# Patient Record
Sex: MALE | Race: WHITE | NOT HISPANIC OR LATINO | Employment: FULL TIME | URBAN - METROPOLITAN AREA
[De-identification: names, ages, dates, MRNs, and addresses within clinical notes are randomized per-mention and may not be internally consistent; named-entity substitution may affect disease eponyms.]

---

## 2023-06-14 DIAGNOSIS — M25.511 RIGHT SHOULDER PAIN, UNSPECIFIED CHRONICITY: Primary | ICD-10-CM

## 2023-06-15 ENCOUNTER — HOSPITAL ENCOUNTER (OUTPATIENT)
Dept: GENERAL RADIOLOGY | Facility: HOSPITAL | Age: 68
Discharge: HOME | End: 2023-06-15
Payer: OTHER MISCELLANEOUS

## 2023-06-15 DIAGNOSIS — M25.511 RIGHT SHOULDER PAIN, UNSPECIFIED CHRONICITY: ICD-10-CM

## 2023-06-15 PROCEDURE — 73030 X-RAY EXAM OF SHOULDER: CPT

## 2023-06-27 ENCOUNTER — TELEPHONE (OUTPATIENT)
Dept: ORTHOPEDIC SURGERY | Facility: CLINIC | Age: 68
End: 2023-06-27

## 2023-07-02 PROBLEM — G89.29 CHRONIC RIGHT SHOULDER PAIN: Status: ACTIVE | Noted: 2023-07-02

## 2023-07-02 PROBLEM — M25.511 CHRONIC RIGHT SHOULDER PAIN: Status: ACTIVE | Noted: 2023-07-02

## 2023-07-24 ENCOUNTER — ANESTHESIA (OUTPATIENT)
Dept: PERIOP | Facility: HOSPITAL | Age: 68
End: 2023-07-24
Payer: OTHER MISCELLANEOUS

## 2023-07-24 ENCOUNTER — ANESTHESIA EVENT (OUTPATIENT)
Dept: PERIOP | Facility: HOSPITAL | Age: 68
End: 2023-07-24
Payer: OTHER MISCELLANEOUS

## 2023-07-24 ENCOUNTER — HOSPITAL ENCOUNTER (OUTPATIENT)
Facility: HOSPITAL | Age: 68
Setting detail: HOSPITAL OUTPATIENT SURGERY
Discharge: HOME | End: 2023-07-24
Attending: ORTHOPAEDIC SURGERY | Admitting: ANESTHESIOLOGY
Payer: OTHER MISCELLANEOUS

## 2023-07-24 VITALS
DIASTOLIC BLOOD PRESSURE: 82 MMHG | OXYGEN SATURATION: 95 % | RESPIRATION RATE: 16 BRPM | BODY MASS INDEX: 35.4 KG/M2 | TEMPERATURE: 97.6 F | SYSTOLIC BLOOD PRESSURE: 131 MMHG | HEART RATE: 70 BPM | WEIGHT: 220.24 LBS | HEIGHT: 66 IN

## 2023-07-24 DIAGNOSIS — G89.29 CHRONIC RIGHT SHOULDER PAIN: ICD-10-CM

## 2023-07-24 DIAGNOSIS — M25.511 CHRONIC RIGHT SHOULDER PAIN: Primary | ICD-10-CM

## 2023-07-24 DIAGNOSIS — G89.29 CHRONIC RIGHT SHOULDER PAIN: Primary | ICD-10-CM

## 2023-07-24 DIAGNOSIS — M25.511 CHRONIC RIGHT SHOULDER PAIN: ICD-10-CM

## 2023-07-24 PROCEDURE — 25010000002 ROPIVACAINE PER 1 MG: Performed by: ANESTHESIOLOGY

## 2023-07-24 PROCEDURE — 25010000002 CEFAZOLIN IN DEXTROSE 2-4 GM/100ML-% SOLUTION: Performed by: ORTHOPAEDIC SURGERY

## 2023-07-24 PROCEDURE — 25010000002 DEXAMETHASONE PER 1 MG: Performed by: ANESTHESIOLOGY

## 2023-07-24 PROCEDURE — 25010000002 FENTANYL CITRATE (PF) 50 MCG/ML SOLUTION: Performed by: ANESTHESIOLOGY

## 2023-07-24 PROCEDURE — 25010000002 ROPIVACAINE PER 1 MG: Performed by: ORTHOPAEDIC SURGERY

## 2023-07-24 PROCEDURE — 25010000002 EPINEPHRINE PER 0.1 MG: Performed by: ORTHOPAEDIC SURGERY

## 2023-07-24 PROCEDURE — C1713 ANCHOR/SCREW BN/BN,TIS/BN: HCPCS | Performed by: ORTHOPAEDIC SURGERY

## 2023-07-24 PROCEDURE — 25010000002 MIDAZOLAM PER 1 MG: Performed by: ANESTHESIOLOGY

## 2023-07-24 PROCEDURE — 25010000002 PROPOFOL 10 MG/ML EMULSION: Performed by: ANESTHESIOLOGY

## 2023-07-24 DEVICE — IMPLANTABLE DEVICE
Type: IMPLANTABLE DEVICE | Site: SHOULDER | Status: FUNCTIONAL
Brand: VENTIX™

## 2023-07-24 DEVICE — BLUE CO-BRAID POLYETHYLENE SIZE 2 38" C-7 NEEDLE BIOMET
Type: IMPLANTABLE DEVICE | Site: SHOULDER | Status: FUNCTIONAL
Brand: TELEFLEX

## 2023-07-24 DEVICE — KNOTLESS TISSUE CONTROL DEVICE, UNDYED UNIDIRECTIONAL (ANTIBACTERIAL) SYNTHETIC ABSORBABLE DEVICE
Type: IMPLANTABLE DEVICE | Site: SHOULDER | Status: FUNCTIONAL
Brand: STRATAFIX

## 2023-07-24 RX ORDER — LIDOCAINE HYDROCHLORIDE 10 MG/ML
0.5 INJECTION, SOLUTION EPIDURAL; INFILTRATION; INTRACAUDAL; PERINEURAL ONCE AS NEEDED
Status: COMPLETED | OUTPATIENT
Start: 2023-07-24 | End: 2023-07-24

## 2023-07-24 RX ORDER — LIDOCAINE HYDROCHLORIDE 20 MG/ML
INJECTION, SOLUTION INFILTRATION; PERINEURAL AS NEEDED
Status: DISCONTINUED | OUTPATIENT
Start: 2023-07-24 | End: 2023-07-24 | Stop reason: SURG

## 2023-07-24 RX ORDER — FLUMAZENIL 0.1 MG/ML
0.2 INJECTION INTRAVENOUS AS NEEDED
Status: DISCONTINUED | OUTPATIENT
Start: 2023-07-24 | End: 2023-07-24 | Stop reason: HOSPADM

## 2023-07-24 RX ORDER — DROPERIDOL 2.5 MG/ML
0.62 INJECTION, SOLUTION INTRAMUSCULAR; INTRAVENOUS
Status: DISCONTINUED | OUTPATIENT
Start: 2023-07-24 | End: 2023-07-24 | Stop reason: HOSPADM

## 2023-07-24 RX ORDER — FENTANYL CITRATE 50 UG/ML
25 INJECTION, SOLUTION INTRAMUSCULAR; INTRAVENOUS
Status: DISCONTINUED | OUTPATIENT
Start: 2023-07-24 | End: 2023-07-24 | Stop reason: HOSPADM

## 2023-07-24 RX ORDER — CEFAZOLIN SODIUM 2 G/100ML
2 INJECTION, SOLUTION INTRAVENOUS ONCE
Status: COMPLETED | OUTPATIENT
Start: 2023-07-24 | End: 2023-07-24

## 2023-07-24 RX ORDER — DEXAMETHASONE SODIUM PHOSPHATE 10 MG/ML
INJECTION INTRAMUSCULAR; INTRAVENOUS AS NEEDED
Status: DISCONTINUED | OUTPATIENT
Start: 2023-07-24 | End: 2023-07-24 | Stop reason: SURG

## 2023-07-24 RX ORDER — NALOXONE HCL 0.4 MG/ML
0.2 VIAL (ML) INJECTION AS NEEDED
Status: DISCONTINUED | OUTPATIENT
Start: 2023-07-24 | End: 2023-07-24 | Stop reason: HOSPADM

## 2023-07-24 RX ORDER — FENTANYL CITRATE 50 UG/ML
50 INJECTION, SOLUTION INTRAMUSCULAR; INTRAVENOUS
Status: DISCONTINUED | OUTPATIENT
Start: 2023-07-24 | End: 2023-07-24 | Stop reason: HOSPADM

## 2023-07-24 RX ORDER — LABETALOL HYDROCHLORIDE 5 MG/ML
5 INJECTION, SOLUTION INTRAVENOUS
Status: DISCONTINUED | OUTPATIENT
Start: 2023-07-24 | End: 2023-07-24 | Stop reason: HOSPADM

## 2023-07-24 RX ORDER — MIDAZOLAM HYDROCHLORIDE 1 MG/ML
2 INJECTION INTRAMUSCULAR; INTRAVENOUS
Status: DISCONTINUED | OUTPATIENT
Start: 2023-07-24 | End: 2023-07-24 | Stop reason: HOSPADM

## 2023-07-24 RX ORDER — DIPHENHYDRAMINE HYDROCHLORIDE 50 MG/ML
12.5 INJECTION INTRAMUSCULAR; INTRAVENOUS
Status: DISCONTINUED | OUTPATIENT
Start: 2023-07-24 | End: 2023-07-24 | Stop reason: HOSPADM

## 2023-07-24 RX ORDER — ROPIVACAINE HYDROCHLORIDE 5 MG/ML
INJECTION, SOLUTION EPIDURAL; INFILTRATION; PERINEURAL AS NEEDED
Status: DISCONTINUED | OUTPATIENT
Start: 2023-07-24 | End: 2023-07-24 | Stop reason: HOSPADM

## 2023-07-24 RX ORDER — HYDROCODONE BITARTRATE AND ACETAMINOPHEN 7.5; 325 MG/1; MG/1
1 TABLET ORAL EVERY 4 HOURS PRN
Status: DISCONTINUED | OUTPATIENT
Start: 2023-07-24 | End: 2023-07-24 | Stop reason: HOSPADM

## 2023-07-24 RX ORDER — DEXAMETHASONE SODIUM PHOSPHATE 4 MG/ML
INJECTION, SOLUTION INTRA-ARTICULAR; INTRALESIONAL; INTRAMUSCULAR; INTRAVENOUS; SOFT TISSUE
Status: COMPLETED | OUTPATIENT
Start: 2023-07-24 | End: 2023-07-24

## 2023-07-24 RX ORDER — FENTANYL CITRATE 50 UG/ML
100 INJECTION, SOLUTION INTRAMUSCULAR; INTRAVENOUS
Status: DISCONTINUED | OUTPATIENT
Start: 2023-07-24 | End: 2023-07-24 | Stop reason: HOSPADM

## 2023-07-24 RX ORDER — SODIUM CHLORIDE 0.9 % (FLUSH) 0.9 %
3 SYRINGE (ML) INJECTION EVERY 12 HOURS SCHEDULED
Status: DISCONTINUED | OUTPATIENT
Start: 2023-07-24 | End: 2023-07-24 | Stop reason: HOSPADM

## 2023-07-24 RX ORDER — IPRATROPIUM BROMIDE AND ALBUTEROL SULFATE 2.5; .5 MG/3ML; MG/3ML
3 SOLUTION RESPIRATORY (INHALATION) ONCE AS NEEDED
Status: DISCONTINUED | OUTPATIENT
Start: 2023-07-24 | End: 2023-07-24 | Stop reason: HOSPADM

## 2023-07-24 RX ORDER — EPHEDRINE SULFATE 50 MG/ML
INJECTION, SOLUTION INTRAVENOUS AS NEEDED
Status: DISCONTINUED | OUTPATIENT
Start: 2023-07-24 | End: 2023-07-24 | Stop reason: SURG

## 2023-07-24 RX ORDER — PROMETHAZINE HYDROCHLORIDE 25 MG/1
25 TABLET ORAL ONCE AS NEEDED
Status: DISCONTINUED | OUTPATIENT
Start: 2023-07-24 | End: 2023-07-24 | Stop reason: HOSPADM

## 2023-07-24 RX ORDER — HYDRALAZINE HYDROCHLORIDE 20 MG/ML
5 INJECTION INTRAMUSCULAR; INTRAVENOUS
Status: DISCONTINUED | OUTPATIENT
Start: 2023-07-24 | End: 2023-07-24 | Stop reason: HOSPADM

## 2023-07-24 RX ORDER — PROMETHAZINE HYDROCHLORIDE 25 MG/1
25 SUPPOSITORY RECTAL ONCE AS NEEDED
Status: DISCONTINUED | OUTPATIENT
Start: 2023-07-24 | End: 2023-07-24 | Stop reason: HOSPADM

## 2023-07-24 RX ORDER — ONDANSETRON 2 MG/ML
4 INJECTION INTRAMUSCULAR; INTRAVENOUS ONCE AS NEEDED
Status: DISCONTINUED | OUTPATIENT
Start: 2023-07-24 | End: 2023-07-24 | Stop reason: HOSPADM

## 2023-07-24 RX ORDER — SODIUM CHLORIDE 0.9 % (FLUSH) 0.9 %
3-10 SYRINGE (ML) INJECTION AS NEEDED
Status: DISCONTINUED | OUTPATIENT
Start: 2023-07-24 | End: 2023-07-24 | Stop reason: HOSPADM

## 2023-07-24 RX ORDER — HYDROCODONE BITARTRATE AND ACETAMINOPHEN 5; 325 MG/1; MG/1
1 TABLET ORAL ONCE AS NEEDED
Status: DISCONTINUED | OUTPATIENT
Start: 2023-07-24 | End: 2023-07-24 | Stop reason: HOSPADM

## 2023-07-24 RX ORDER — ROPIVACAINE HYDROCHLORIDE 5 MG/ML
INJECTION, SOLUTION EPIDURAL; INFILTRATION; PERINEURAL
Status: COMPLETED | OUTPATIENT
Start: 2023-07-24 | End: 2023-07-24

## 2023-07-24 RX ORDER — HYDROCODONE BITARTRATE AND ACETAMINOPHEN 5; 325 MG/1; MG/1
1 TABLET ORAL EVERY 4 HOURS PRN
Qty: 30 TABLET | Refills: 0 | Status: SHIPPED | OUTPATIENT
Start: 2023-07-24 | End: 2023-07-24 | Stop reason: SDUPTHER

## 2023-07-24 RX ORDER — HYDROCODONE BITARTRATE AND ACETAMINOPHEN 5; 325 MG/1; MG/1
1 TABLET ORAL EVERY 4 HOURS PRN
Qty: 30 TABLET | Refills: 0 | Status: SHIPPED | OUTPATIENT
Start: 2023-07-24

## 2023-07-24 RX ORDER — PROPOFOL 10 MG/ML
VIAL (ML) INTRAVENOUS AS NEEDED
Status: DISCONTINUED | OUTPATIENT
Start: 2023-07-24 | End: 2023-07-24 | Stop reason: SURG

## 2023-07-24 RX ORDER — SODIUM CHLORIDE, SODIUM LACTATE, POTASSIUM CHLORIDE, CALCIUM CHLORIDE 600; 310; 30; 20 MG/100ML; MG/100ML; MG/100ML; MG/100ML
9 INJECTION, SOLUTION INTRAVENOUS CONTINUOUS
Status: DISCONTINUED | OUTPATIENT
Start: 2023-07-24 | End: 2023-07-24 | Stop reason: HOSPADM

## 2023-07-24 RX ORDER — EPHEDRINE SULFATE 50 MG/ML
5 INJECTION, SOLUTION INTRAVENOUS ONCE AS NEEDED
Status: DISCONTINUED | OUTPATIENT
Start: 2023-07-24 | End: 2023-07-24 | Stop reason: HOSPADM

## 2023-07-24 RX ORDER — HYDROMORPHONE HYDROCHLORIDE 1 MG/ML
0.25 INJECTION, SOLUTION INTRAMUSCULAR; INTRAVENOUS; SUBCUTANEOUS
Status: DISCONTINUED | OUTPATIENT
Start: 2023-07-24 | End: 2023-07-24 | Stop reason: HOSPADM

## 2023-07-24 RX ADMIN — EPHEDRINE SULFATE 10 MG: 50 INJECTION INTRAVENOUS at 10:10

## 2023-07-24 RX ADMIN — SODIUM CHLORIDE, POTASSIUM CHLORIDE, SODIUM LACTATE AND CALCIUM CHLORIDE: 600; 310; 30; 20 INJECTION, SOLUTION INTRAVENOUS at 11:16

## 2023-07-24 RX ADMIN — FENTANYL CITRATE 50 MCG: 50 INJECTION, SOLUTION INTRAMUSCULAR; INTRAVENOUS at 08:10

## 2023-07-24 RX ADMIN — EPHEDRINE SULFATE 10 MG: 50 INJECTION INTRAVENOUS at 10:16

## 2023-07-24 RX ADMIN — ROPIVACAINE HYDROCHLORIDE 20 ML: 5 INJECTION EPIDURAL; INFILTRATION; PERINEURAL at 08:23

## 2023-07-24 RX ADMIN — EPHEDRINE SULFATE 10 MG: 50 INJECTION INTRAVENOUS at 10:30

## 2023-07-24 RX ADMIN — MIDAZOLAM 2 MG: 1 INJECTION INTRAMUSCULAR; INTRAVENOUS at 08:10

## 2023-07-24 RX ADMIN — CEFAZOLIN SODIUM 2 G: 2 INJECTION, SOLUTION INTRAVENOUS at 09:22

## 2023-07-24 RX ADMIN — DEXAMETHASONE SODIUM PHOSPHATE 4 MG: 4 INJECTION, SOLUTION INTRA-ARTICULAR; INTRALESIONAL; INTRAMUSCULAR; INTRAVENOUS; SOFT TISSUE at 08:23

## 2023-07-24 RX ADMIN — SODIUM CHLORIDE, POTASSIUM CHLORIDE, SODIUM LACTATE AND CALCIUM CHLORIDE 9 ML/HR: 600; 310; 30; 20 INJECTION, SOLUTION INTRAVENOUS at 08:04

## 2023-07-24 RX ADMIN — DEXAMETHASONE SODIUM PHOSPHATE 8 MG: 10 INJECTION INTRAMUSCULAR; INTRAVENOUS at 09:42

## 2023-07-24 RX ADMIN — EPHEDRINE SULFATE 10 MG: 50 INJECTION INTRAVENOUS at 09:48

## 2023-07-24 RX ADMIN — PROPOFOL 200 MG: 10 INJECTION, EMULSION INTRAVENOUS at 09:34

## 2023-07-24 RX ADMIN — LIDOCAINE HYDROCHLORIDE 0.5 ML: 10 INJECTION, SOLUTION EPIDURAL; INFILTRATION; INTRACAUDAL; PERINEURAL at 08:04

## 2023-07-24 RX ADMIN — LIDOCAINE HYDROCHLORIDE 60 MG: 20 INJECTION, SOLUTION INFILTRATION; PERINEURAL at 09:34

## 2023-07-24 NOTE — ANESTHESIA PROCEDURE NOTES
Peripheral Block    Pre-sedation assessment completed: 7/24/2023 8:15 AM    Patient reassessed immediately prior to procedure    Patient location during procedure: pre-op  Start time: 7/24/2023 8:15 AM  Stop time: 7/24/2023 8:23 AM  Reason for block: at surgeon's request and post-op pain management  Performed by  Anesthesiologist: Sid White MD  Preanesthetic Checklist  Completed: patient identified, IV checked, site marked, risks and benefits discussed, surgical consent, monitors and equipment checked, pre-op evaluation and timeout performed  Prep:  Pt Position: supine  Sterile barriers:cap, gloves, mask and sterile barriers  Prep: ChloraPrep  Patient monitoring: blood pressure monitoring, continuous pulse oximetry and EKG  Procedure    Sedation: yes  Performed under: local infiltration  Guidance:ultrasound guided    ULTRASOUND INTERPRETATION.  Using ultrasound guidance a 22 G gauge needle was placed in close proximity to the brachial plexus nerve, at which point, under ultrasound guidance anesthetic was injected in the area of the nerve and spread of the anesthesia was seen on ultrasound in close proximity thereto.  There were no abnormalities seen on ultrasound; a digital image was taken; and the patient tolerated the procedure with no complications. Images:still images obtained, printed/placed on chart (U/S used to localize the nerve)    Laterality:right  Block Type:interscalene  Injection Technique:single-shot  Needle Type:echogenic  Needle Gauge:22 G  Resistance on Injection: less than 15 psi    Medications Used: dexamethasone (DECADRON) injection - Injection   4 mg - 7/24/2023 8:23:00 AM  ropivacaine (NAROPIN) 0.5 % injection - Injection   20 mL - 7/24/2023 8:23:00 AM      Post Assessment  Injection Assessment: negative aspiration for heme, no paresthesia on injection and incremental injection  Patient Tolerance:comfortable throughout block  Complications:no

## 2023-07-24 NOTE — ANESTHESIA POSTPROCEDURE EVALUATION
Patient: Ishaan Booth    Procedure Summary       Date: 07/24/23 Room / Location: [facility] OR  /  [facility]    Anesthesia Start: 0927 Anesthesia Stop: 1130    Procedure: Right shoulder arthroscopy with mini open rotator cuff repair (Right: Shoulder) Diagnosis:       Chronic right shoulder pain      (Chronic right shoulder pain [M25.511, G89.29])    Surgeons: Fernando Hines MD Provider: Suzanne Romero MD    Anesthesia Type: general with block ASA Status: 3            Anesthesia Type: general with block    Vitals  Vitals Value Taken Time   /66 07/24/23 1200   Temp 36.4 °C (97.6 °F) 07/24/23 1128   Pulse 70 07/24/23 1206   Resp 16 07/24/23 1200   SpO2 96 % 07/24/23 1206   Vitals shown include unvalidated device data.        Post Anesthesia Care and Evaluation    Patient location during evaluation: bedside  Patient participation: complete - patient participated  Level of consciousness: awake  Pain management: adequate    Airway patency: patent  Anesthetic complications: No anesthetic complications  PONV Status: controlled  Cardiovascular status: acceptable  Respiratory status: acceptable  Hydration status: acceptable    Comments: --------------------            07/24/23               1215     --------------------   BP:       131/82     Pulse:      70       Resp:       16       Temp:                SpO2:      95%      --------------------

## 2023-07-24 NOTE — ANESTHESIA PROCEDURE NOTES
Airway  Urgency: elective    Date/Time: 7/24/2023 9:37 AM  Airway not difficult    General Information and Staff    Patient location during procedure: OR  Anesthesiologist: Suzanne Romero MD    Indications and Patient Condition  Indications for airway management: airway protection    Preoxygenated: yes  Mask difficulty assessment: 1 - vent by mask    Final Airway Details  Final airway type: supraglottic airway      Successful airway: unique  Size 4     Number of attempts at approach: 1  Assessment: lips, teeth, and gum same as pre-op and atraumatic intubation    Additional Comments  Cuff up to airway seal pressure at 18

## 2023-07-24 NOTE — ANESTHESIA PREPROCEDURE EVALUATION
Anesthesia Evaluation     Patient summary reviewed and Nursing notes reviewed   NPO Solid Status: > 8 hours             Airway   Mallampati: II  TM distance: >3 FB  Neck ROM: full  no difficulty expected  Dental - normal exam     Pulmonary - normal exam   (+) ,sleep apnea  Cardiovascular - negative cardio ROS and normal exam        Neuro/Psych- negative ROS  GI/Hepatic/Renal/Endo - negative ROS     Musculoskeletal (-) negative ROS    Abdominal    Substance History - negative use     OB/GYN negative ob/gyn ROS         Other                      Anesthesia Plan    ASA 3     general with block     intravenous induction     Anesthetic plan, risks, benefits, and alternatives have been provided, discussed and informed consent has been obtained with: patient.    CODE STATUS:

## 2023-07-24 NOTE — OP NOTE
PATIENT NAME: Ishaan Booth  PATIENT :1955   DATE OF PROCEDURE: 2023    PRINCIPAL DIAGNOSIS: Chronic right shoulder pain [M25.511, G89.29]  SECONDARY DIAGNOSIS: Right shoulder rotator cuff tear with AC joint arthritis with impingement syndrome.  POSTOPERATIVE DIAGNOSIS: Post-Op Diagnosis Codes:     * Chronic right shoulder pain [M25.511, G89.29]  PROCEDURE PERFORMED: Right shoulder arthroscopy with arthroscopic acromioplasty with resection of the distal clavicle with mini open repair of the rotator cuff tear.  SURGEON: KAMILLE GARCIA M.D.  ASSISTANT: Rona Mcintosh first assistant was responsible for performing the following activities: Retraction, Suction, Irrigation, Suturing, Closing, and Placing Dressing and their skilled assistance was necessary for the success of this case.     ANESTHESIOLOGIST: Dr. Sid White MD  ANESTHESIA USED: General with an LMA.  ANALGESIC PROCEDURE USED: Supraclavicular scalene block for postop pain control.  ESTIMATED BLOOD LOSS: 100ml  SPECIMENS: * No orders in the log *  IMPLANTS USED: A Ventix suture anchor from [employer] supports was used.  COMPLICATIONS (IF ANY): None.  SURGICAL APPROACH: Single Row          POSITION: Beach chair on beach chair pittman with all bony prominences padded.     INDICATIONS:  The patient has been complaining of pain and discomfort in the shoulder. Weakness in abduction. All forms of conservative care have been tried and failed to provide the patient with adequate relief of symptoms. Risks and benefits were discussed in great detail. Possibility of death, infection, myocardial infarction, DVT, pulmonary embolism, wound infection, stiffness of the shoulder, extensive physical therapy needed, possibility of revision surgery and progressive arthritis discussed with the patient in great detail. Accordingly an informed consent was signed for surgical intervention.      PROCEDURE: Surgical timeout was called. Operative extremity was  correctly identified in the operating suite. Patient was placed under a scalene block anesthetic followed by general anesthesia. Positioned in a beach chair position with appropriate padding of all bony prominences. The shoulder was examined under anesthesia. There was no significant laxity of the ligaments. Apprehension sign was negative. There was no abnormal translation of the humeral head on the glenoid.      The shoulder was prepped and draped in the standard fashion. Arthroscopic portals were established. The glenohumeral joint was inspected first. The condition of the articular cartilage was observed anteriorly. The glenoid labrum was identified. The glenohumeral ligament was palpated with a probe. The findings of the glenoid labrum included irregular wear and tear of the superior glenoid but no SLAP tear.  This area was touched with a shaver and a Bovie to prevent catching and locking.  The remainder of the labrum was essentially intact and was left uninstrumented.  The biceps anchor was investigated and visualized. There were no loose bodies in the joint. The undersurface of the rotator cuff tear was inspected. The rotator cuff tear showed an irregular retracted tear anteriorly over the junction of the supraspinatus and the subscapularis.  The tendon did show some signs of atrophy as well.  The bed of the rotator cuff footprint was inspected as well. Traction sutures were placed.     The scope was repositioned in the subacromial space. A generous subacromial bursectomy was carried out. Anteriorly the CA ligament was carefully released. The dorsal aspect of the rotator cuff tear was inspected. A step-cut technique was used to perform arthroscopic acromioplasty. Upon completion of the subacromial decompression, there was no impingement on the dorsal aspect of the rotator cuff. The lateral end of the clavicle was identified. It was extremely arthritic and a Gilberto resection was performed and about 9 mm of  bone was removed from the lateral end of the clavicle.  Upon completion of the Gilberto resection there was no contact between the lateral clavicle and the medial acromion. The arthroscopic fluid was evacuated from the joint. The arthroscopic equipment was removed from the joint and a mini open repair was commenced.     The deltoid fibers were carefully split. Great care was taken to prevent avulsion of the deltoid from the lateral end of the clavicle.The rotator cuff tear was identified.  It was an irregular linear tear at the junction between the subscapularis and the supraspinatus anteriorly.  The traction sutures were used and the tendon was mobilized. Bleeding bone on the footprint of the greater tuberosity of the humerus was exposed to provide a bleeding bed of bone to optimize healing. The rotator cuff tear was mobilized. The suture anchor was placed at dead-man’s angle and the tails of the suture were brought out anterolaterally. The rotator cuff tendon was advanced and the leading edge of the tendon was implanted onto its footprint. The sutures were tied down in a modified Karl-Theo technique. The tails of the suture anchor were brought and implanted into the anterolateral and posteromedial shaft of the humerus with a Ventix suture anchor to perform a true double row repair.  Upon completion of the repair, the humeral head was well covered and there was no tension on the repair site. A watertight closure was accomplished.     Wounds were lavaged with bacitracin irrigating solution. Deltoid fibers were carefully approximated with intraoperative sutures. The subcuticular sutures were applied and 30 mL of 0.5% Naropin plain was used postoperatively for analgesia. Occlusive dressing was applied. A slingshot was applied to protect the repair and the extremity postoperatively. No complications were encountered.  Sponge, gauze and needle counts were correct.  Patient was reversed from anesthetic and taken from  the operating room to the recovery room in stable condition. I discussed a satisfactory performance of this procedure with the patient’s family and showed them the arthroscopic pictures.      Fernando Hines MD  7/24/2023  11:09 EDT

## 2023-07-24 NOTE — H&P
History & Physical       Patient: Ishaan Booth    Date of Admission: 2023  6:57 AM    YOB: 1955    Medical Record Number: [id number]    Attending Physician: Fernando Hines MD        Chief Complaints: Chronic right shoulder pain [M25.511, G89.29]      History of Present Illness: 67 y.o. male presents with Chronic right shoulder pain [M25.511, G89.29]. Onset of symptoms was gradual and slowly progressive over the past 6 months.  Symptoms are associated with pain and discomfort in the right shoulder with weakness in abduction.  Symptoms are aggravated by forward flexion and abduction of the shoulder.   Symptoms improve with resting the arm by the side. Patient is now being admitted to the services of Fernando Hines MD for further evaluation and treatment.        Allergies   Allergen Reactions    Hydroxyquinolines Hives and Itching         Home Medications:  Medications Prior to Admission   Medication Sig Dispense Refill Last Dose    Humira Pen 40 MG/0.4ML Pen-injector Kit 2 (Two) Times a Week.   2023       Current Medications:  Scheduled Meds:  Continuous Infusions:No current facility-administered medications for this encounter.    PRN Meds:.       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    KNEE ACL RECONSTRUCTION      KNEE ARTHROSCOPY Bilateral     KNEE MENISCAL REPAIR Left     LAPAROSCOPIC CHOLECYSTECTOMY          Social History     Occupational History    Not on file   Tobacco Use    Smoking status: Former     Packs/day: 2.00     Years: 10.00     Pack years: 20.00     Types: Cigarettes     Start date:      Quit date:      Years since quittin.5    Smokeless tobacco: Never   Vaping Use    Vaping Use: Never used   Substance and Sexual Activity     Alcohol use: Never    Drug use: Never    Sexual activity: Defer      Social History     Social History Narrative    Not on file        Family History   Problem Relation Age of Onset    Malig Hyperthermia Neg Hx          Review of Systems:   HEENT: Patient denies any headaches, vision changes, change in hearing, or tinnitus, Patient denies any rhinorrhea,epistaxis, sinus pain, mouth or dental problems, sore throat or hoarseness, or dysphagia  Pulmonary: Patient denies any cough, congestion, SOA, or wheezing  Cardiovascular: Patient denies any chest pain, dyspnea, palpitations, weakness, intolerance of exercise, varicosities, swelling of extremities, known murmur  Gastrointestinal:  Patient denies nausea, vomiting, diarrhea, constipation, loss  of appetite, change in appetite, dysphagia, gas, heartburn, melena, change in bowel habits, use of laxatives or other drugs to alter the function of the gastrointestinal tract.  Genital/Urinary: Patient denies dysuria, change in color of urine, change in frequency of urination, pain with urgency, incontinence, retention, or nocturia.  Musculoskeletal: Patient denies increased warmth; redness; or swelling of joints; limitation of function; deformity; crepitation: pain in a joint or an extremity, the neck, or the back, especially with movement.  Neurological: Patient denies dizziness, tremor, ataxia, difficulty in speaking, change in speech, paresthesia, loss of sensation, seizures, syncope, changes in memory.  Endocrine system: Patient denies tremors, palpitations, intolerance of heat or cold, polyuria, polydipsia, polyphagia, diaphoresis, exophthalmos, or goiter.  Psychological: Patient denies thoughts/plans or harming self or other; depression,  insomnia, night terrors, liz, memory loss, disorientation.  Skin: Patient denies any bruising, rashes, discoloration, pruritus, wounds, ulcers, decubiti, changes in the hair or nails  Hematopoietic: Patient denies history of  spontaneous or excessive bleeding, epistaxis, hematuria, melena, fatigue, enlarged or tender lymph nodes, pallor, history of anemia.    Physical Exam: 67 y.o. male  There were no vitals filed for this visit.    General Appearance:          Alert, cooperative, in no acute distress                                                 Head:    Normocephalic, without obvious abnormality, atraumatic   Eyes:            Lids and lashes normal, conjunctivae and sclerae normal, no   icterus, no pallor, corneas clear, PERRLA   Ears:    Ears appear intact with no abnormalities noted   Throat:   No oral lesions, no thrush, oral mucosa moist   Neck:   No adenopathy, supple, trachea midline, no thyromegaly, no   carotid bruit, no JVD   Back:     No kyphosis present, no scoliosis present, no skin lesions,      erythema or scars, no tenderness to percussion or                   palpation,   range of motion normal   Lungs:     Clear to auscultation,respirations regular, even and                  unlabored    Heart:    Regular rhythm and normal rate, normal S1 and S2, no            murmur, no gallop, no rub, no click   Chest Wall:    No abnormalities observed   Abdomen:     Normal bowel sounds, no masses, no organomegaly, soft        nontender, nondistended, no guarding, no rebound                tenderness   Rectal:     Deferred   Extremities:   Tenderness over anterior and lateral aspect of the right shoulder. Moves all extremities well, no edema,   no cyanosis, no redness   Pulses:   Pulses palpable and equal bilaterally   Skin:   No bleeding, bruising or rash   Lymph nodes:   No palpable adenopathy   Neurologic:   Cranial nerves 2 - 12 grossly intact, sensation intact, DTR       present and equal bilaterally           Diagnostic Tests:  No visits with results within 2 Day(s) from this visit.   Latest known visit with results is:   No results found for any previous visit.     No results found.      Assessment:    Chronic right shoulder  pain        Plan:  The patient voiced understanding of the risks, benefits, and alternative forms of treatment that were discussed and the patient consents to proceed with right shoulder arthroscopy with distal clavicle resection with arthroscopic acromioplasty with mini open repair of the rotator cuff.   Patient has been diagnosed with a rotator cuff tear.  These tears can range from partial tears to complete tears of the muscle and/or tendon. They can also be categorized in size by width as being small, medium or large.  Diagnosis is confirmed with MRI or CT/arthrogram.  Management of these tears can be conservative with injections, physical therapy, activity modification and use of NSAIDS as needed.  Surgery is the only way to actually fix these tears, as they will not heal or repair on their own.  These tears can usually be repaired with arthroscopic surgery, but occasionally open procedures are necessary.  Many factors can influence the outcomes. First, larger tears have a higher chance of failure from a healing perspective, although pain may improve nonetheless, potentially longer recovery and sometimes, depending on the quality of the tissue and the length of time the tear has been present, may not be repairable at all.  If the tear has been going on for a long time, the muscle can actually change to fatty tissue, and no longer act as muscle.  This can have a direct effect on not only the ability to repair the tear but also the outcome from the surgery itself both from a healing and functional perspective.  Therefore, the decision to proceed with surgery does have a time factor which can affect the quality of the tissue and reparability but also the potential for the tear to increase in size.  The longer you wait to repair the torn tendon there can be decreased potential for a successful outcome.    Risks of surgery have been discussed with the patient in detail.  These risks include but are not limited to  possibility of infection, DVT, continued pain, continued progression of arthritis, use of allograft tissue, limited range of motion and strength and further surgical intervention.  Patient understands that the surgery will benefit mechanical symptoms of pain and instability but may not help with symptoms of arthritis.       Discharge Plan: today to home      Date: 7/24/2023    Fernando Hines MD      DICTATED UTILIZING DRAGON DICTATION

## 2023-08-03 ENCOUNTER — OFFICE VISIT (OUTPATIENT)
Dept: ORTHOPEDIC SURGERY | Facility: CLINIC | Age: 68
End: 2023-08-03
Payer: OTHER MISCELLANEOUS

## 2023-08-03 VITALS — WEIGHT: 220 LBS | BODY MASS INDEX: 35.36 KG/M2 | HEIGHT: 66 IN | TEMPERATURE: 97.5 F

## 2023-08-03 DIAGNOSIS — Z98.890 S/P ROTATOR CUFF SURGERY: Primary | ICD-10-CM

## 2023-08-03 PROCEDURE — 99024 POSTOP FOLLOW-UP VISIT: CPT | Performed by: ORTHOPAEDIC SURGERY

## 2023-08-03 NOTE — PROGRESS NOTES
OTHER POST OP GLOBAL     NAME: Ishaan Booth  ?  : 1955  ?  MRN: [id number]    Chief Complaint   Patient presents with    Right Shoulder - Follow-up     ?  Date of surgery: 23    HPI:   Patient returns today for 10 day follow up of right rotator cuff repair. Arthroscopic portals healing nicely/as expected with no signs of infection. Patient reports doing well with no unusual complaints. Appears to be progressing appropriately.      Ortho Exam:   RIGHT shoulder  Right shoulder. The patient is status-post rotator cuff repair 10 day(s) . Incision is clean and healing well. Arthroscopic portals are clean. Appropriate amounts of swelling and bruising are noted. The patients pain is well controlled. The passive range of motion is abduction 0-30 degrees, flexion 0-30 degrees. Axillary nerve function is well preserved. Radial artery pulses are palpable.      Diagnostic Studies:  no diagnostic testing performed this visit      Assessment:  Diagnoses and all orders for this visit:    1. S/P rotator cuff surgery (Primary)  -     Ambulatory Referral to Physical Therapy Evaluate and treat, POST OP          Plan   Incision care  Continue ice as needed  No active ROM, only passive ROM  Stretching and strengthening exercises  Fall precautions  Follow up in 4-6 week(s)     Date of encounter: 2023  Fernando Hines MD

## 2023-08-08 PROBLEM — Z98.890 S/P ROTATOR CUFF SURGERY: Status: ACTIVE | Noted: 2023-08-08

## 2023-08-11 ENCOUNTER — TELEPHONE (OUTPATIENT)
Dept: ORTHOPEDIC SURGERY | Facility: CLINIC | Age: 68
End: 2023-08-11
Payer: MEDICARE

## 2023-08-15 ENCOUNTER — TELEPHONE (OUTPATIENT)
Dept: ORTHOPEDIC SURGERY | Facility: CLINIC | Age: 68
End: 2023-08-15
Payer: MEDICARE

## 2023-08-15 ENCOUNTER — OFFICE VISIT (OUTPATIENT)
Dept: ORTHOPEDIC SURGERY | Facility: CLINIC | Age: 68
End: 2023-08-15
Payer: MEDICARE

## 2023-08-15 VITALS — WEIGHT: 220 LBS | BODY MASS INDEX: 35.36 KG/M2 | HEIGHT: 66 IN | TEMPERATURE: 97.8 F

## 2023-08-15 DIAGNOSIS — T81.89XA RETAINED SUTURE, INITIAL ENCOUNTER: ICD-10-CM

## 2023-08-15 DIAGNOSIS — Z18.9 RETAINED SUTURE, INITIAL ENCOUNTER: ICD-10-CM

## 2023-08-15 DIAGNOSIS — Z98.890 S/P ROTATOR CUFF SURGERY: Primary | ICD-10-CM

## 2023-08-15 DIAGNOSIS — L53.9 ERYTHEMA OF WOUND: ICD-10-CM

## 2023-08-15 PROCEDURE — 1159F MED LIST DOCD IN RCRD: CPT | Performed by: PHYSICIAN ASSISTANT

## 2023-08-15 PROCEDURE — 1160F RVW MEDS BY RX/DR IN RCRD: CPT | Performed by: PHYSICIAN ASSISTANT

## 2023-08-15 PROCEDURE — 99024 POSTOP FOLLOW-UP VISIT: CPT | Performed by: PHYSICIAN ASSISTANT

## 2023-08-15 RX ORDER — TAMSULOSIN HYDROCHLORIDE 0.4 MG/1
CAPSULE ORAL
COMMUNITY
Start: 2023-08-09

## 2023-08-15 NOTE — TELEPHONE ENCOUNTER
Patients wife called to state the patients incision is red and she believes it is infected. I will attach images sent from the wife into the system.    Per Venus we will bring the patient in today for evaluation of the incision     Patients wife has been advised to arrive no later than 3:30pm

## 2023-08-16 PROBLEM — L53.9 ERYTHEMA OF WOUND: Status: ACTIVE | Noted: 2023-08-16

## 2023-08-16 PROBLEM — Z18.9 RETAINED SUTURE: Status: ACTIVE | Noted: 2023-08-16

## 2023-08-16 PROBLEM — T81.89XA RETAINED SUTURE: Status: ACTIVE | Noted: 2023-08-16

## 2023-08-16 RX ORDER — DOXYCYCLINE HYCLATE 100 MG/1
100 CAPSULE ORAL 2 TIMES DAILY
Qty: 20 CAPSULE | Refills: 0 | Status: SHIPPED | OUTPATIENT
Start: 2023-08-16

## 2023-08-16 NOTE — PROGRESS NOTES
OTHER POST OP GLOBAL     NAME: Ishaan Booth  ?  : 1955  ?  MRN: [id number]    Chief Complaint   Patient presents with    Right Shoulder - Post-op     Rotator cuff repair 23     ?  Date of surgery: 23    HPI:   Patient returns today for 2.5 week follow up of right rotator cuff repair. Incision(s) and Arthroscopic portals show erythema and appear as if there are retained sutures in each location. Patient reports doing well with no unusual complaints. Appears to be progressing appropriately. He reports redness and tenderness over the incision and portals. He states he has not been keeping the areas covered and has been outside mowing.      Ortho Exam:   RIGHT shoulder  The patient is status-post rotator cuff repair 2.5 week(s).   Incision and arthroscopic portals are not clean and appear to have retained sutures.  Appropriate amounts of swelling and bruising are noted.   The patients pain is well controlled.   The passive range of motion is abduction 20 degrees, flexion 20 degrees.   Axillary nerve function is well preserved. Radial artery pulses are palpable.      Diagnostic Studies:        Assessment:  Diagnoses and all orders for this visit:    1. S/P rotator cuff surgery (Primary)    2. Retained suture, initial encounter    3. Erythema of wound    Other orders  -     doxycycline (VIBRAMYCIN) 100 MG capsule; Take 1 capsule by mouth 2 (Two) Times a Day.  Dispense: 20 capsule; Refill: 0          Plan   Incision care--retained sutures removed and areas cleaned.   Advised him to keep areas covered at all times, except when showering.   Advised no bath or swimming/submerging the incisions.  Continue ice as needed  No lifting, pushing or pulling activity   Stretching and strengthening exercises  Fall precautions  Follow up as scheduled    Date of encounter: 08/15/2023  Willy Gambino PA-C    Electronically signed by Willy Gambino PA-C, 23, 12:17 PM EDT.

## 2023-09-01 ENCOUNTER — OFFICE VISIT (OUTPATIENT)
Dept: ORTHOPEDIC SURGERY | Facility: CLINIC | Age: 68
End: 2023-09-01
Payer: OTHER MISCELLANEOUS

## 2023-09-01 VITALS — HEIGHT: 66 IN | BODY MASS INDEX: 35.36 KG/M2 | WEIGHT: 220 LBS

## 2023-09-01 DIAGNOSIS — Z98.890 S/P ROTATOR CUFF SURGERY: Primary | ICD-10-CM

## 2023-09-01 NOTE — PROGRESS NOTES
OTHER POST OP GLOBAL     NAME: Ishaan Booth  ?  : 1955  ?  MRN: [id number]    Chief Complaint   Patient presents with    Right Shoulder - Post-op     ?  Date of surgery: 2023    HPI:   Patient returns today for 5.5 week follow up of right rotator cuff repair. Arthroscopic portals healing nicely/as expected with no signs of infection. There is a small area within the incision that appears as if there is a retained suture. Patient reports doing well with no unusual complaints. Appears to be progressing appropriately.      Ortho Exam:   RIGHT shoulder  The patient is status-post rotator cuff repair 5.5 week(s).  Incision is clean and healed well. Arthroscopic portals are clean.   Appropriate amounts of swelling noted.    The passive range of motion is abduction 90 degrees, flexion 90 degrees.   Axillary nerve function is well preserved. Radial artery pulses are palpable.      Diagnostic Studies:  no diagnostic testing performed this visit      Assessment:  Diagnoses and all orders for this visit:    1. S/P rotator cuff surgery (Primary)          Plan   Incision care,   Continue ice as needed  Active/Active Assisted ROM of elbow, wrist and hand  Stretching and strengthening exercises  Continue with PT current POC  Fall precautions  Follow up in 4 week(s) with Dr. Hines     Date of encounter: 2023  Willy Gambino PA-C    Electronically signed by Willy Gambino PA-C, 23, 3:12 PM EDT.

## 2023-09-25 ENCOUNTER — TELEPHONE (OUTPATIENT)
Dept: ORTHOPEDIC SURGERY | Facility: CLINIC | Age: 68
End: 2023-09-25

## 2023-09-25 NOTE — TELEPHONE ENCOUNTER
Caller: JAMARI WILLS    Relationship to patient: Emergency Contact    Best call back number: [phone]    Chief complaint: RIGHT KNEE PAIN     Type of visit: NEW PROBLEM     Requested date: ASAP      Additional notes:PATIENT HAS ACL REPAIR ON THE RIGHT KNEE 20 YEARS AGO

## 2023-09-28 ENCOUNTER — OFFICE VISIT (OUTPATIENT)
Dept: ORTHOPEDIC SURGERY | Facility: CLINIC | Age: 68
End: 2023-09-28
Payer: OTHER MISCELLANEOUS

## 2023-09-28 VITALS — TEMPERATURE: 97.8 F | BODY MASS INDEX: 36 KG/M2 | HEIGHT: 66 IN | WEIGHT: 224 LBS

## 2023-09-28 DIAGNOSIS — Z98.890 S/P ROTATOR CUFF SURGERY: Primary | ICD-10-CM

## 2023-09-28 NOTE — PROGRESS NOTES
"Chief Complaint  Follow-up of the Right Shoulder (Rotator cuff repair 7/24/23)    Subjective    History of Present Illness      Ishaan Booth is a 67 y.o. male who presents to [facility] for follow-up on his right rotator cuff repair  History of Present Illness I had performed a shoulder arthroscopy on the patient's right shoulder 9 and half weeks ago.  His date of surgery was 24 July 2023.  He had sustained an injury which is covered by his Work[employer]'s Compensation company.  He is doing quite well with physical therapy.  Range of motion of the shoulder has improved considerably.  He is now able to abduct his shoulder to about 120 degrees and forward flexion is 0 to 120 degrees as well.  He is neurovascularly intact.  He is not taking any significant amount of pain medication.  He is very pleased with his postoperative outcome specifically the pain control.  Pain Locati.on: RIGHT shoulder  Radiation: none  Quality: dull  Intensity/Severity: moderate  Duration:  Several months  Objective   Vital Signs:   Temp 97.8 øF (36.6 øC)   Ht 167.6 cm (66\")   Wt 102 kg (224 lb)   BMI 36.15 kg/mý     Physical Exam  Physical Exam  Vitals signs and nursing note reviewed.   Constitutional:       Appearance: Normal appearance.   Pulmonary:      Effort: Pulmonary effort is normal.   Skin:     General: Skin is warm and dry.      Capillary Refill: Capillary refill takes less than 2 seconds.   Neurological:      General: No focal deficit present.      Mental Status: He is alert and oriented to person, place, and time. Mental status is at baseline.   Psychiatric:         Mood and Affect: Mood normal.         Behavior: Behavior normal.         Thought Content: Thought content normal.         Judgment: Judgment normal.     Ortho Exam   Right shoulder. The patient is status-post rotator cuff repair 9.5 week(s) . Incision is clean and healing well. Arthroscopic portals are clean. Appropriate amounts of " swelling and bruising are noted. The patients pain is well controlled. The passive range of motion is abduction 0-120 degrees, flexion 0-120 degrees. Axillary nerve function is well preserved. Radial artery pulses are palpable.        Result Review :  The following data was reviewed by: Fernando Hines MD on 09/28/2023:  Radiologic studies - see below for interpretation  no diagnostic testing performed this visit            Procedures           Assessment   Assessment and Plan    Diagnoses and all orders for this visit:    1. S/P rotator cuff surgery (Primary)          Follow Up   Continue with gentle active range of motion exercises of the shoulder with physical therapy.  He is not yet ready to go on back to work and therefore note has been given to the patient to have him continue to be off work.  Reinjury precautions discussed with the patient.  He is not requesting me for any pain medication today in the office.  Rest, ice, compression, and elevation (RICE) therapy  Stretching and strengthening exercises  OTC Alternate Ibuprofen and Tylenol as needed  Follow up in 6 week(s)  Patient was given instructions and counseling regarding his condition or for health maintenance advice. Please see specific information pulled into the AVS if appropriate.     Fernando Hines MD   Date of Encounter: 9/28/2023   Electronically signed by Fernando Hines MD, 09/28/23, 10:02 AM EDT.     EMR Dragon/Transcription disclaimer:  Much of this encounter note is an electronic transcription/translation of spoken language to printed text. The electronic translation of spoken language may permit erroneous, or at times, nonsensical words or phrases to be inadvertently transcribed; Although I have reviewed the note for such errors, some may still exist.

## 2023-10-03 DIAGNOSIS — M25.561 RIGHT KNEE PAIN, UNSPECIFIED CHRONICITY: Primary | ICD-10-CM

## 2023-10-12 ENCOUNTER — HOSPITAL ENCOUNTER (OUTPATIENT)
Dept: GENERAL RADIOLOGY | Facility: HOSPITAL | Age: 68
Discharge: HOME | End: 2023-10-12
Admitting: ORTHOPAEDIC SURGERY
Payer: MEDICARE

## 2023-10-12 ENCOUNTER — OFFICE VISIT (OUTPATIENT)
Dept: ORTHOPEDIC SURGERY | Facility: CLINIC | Age: 68
End: 2023-10-12
Payer: MEDICARE

## 2023-10-12 VITALS — WEIGHT: 218.4 LBS | HEIGHT: 66 IN | BODY MASS INDEX: 35.1 KG/M2

## 2023-10-12 DIAGNOSIS — M25.561 RIGHT KNEE PAIN, UNSPECIFIED CHRONICITY: ICD-10-CM

## 2023-10-12 DIAGNOSIS — M23.203 OLD COMPLEX TEAR OF MEDIAL MENISCUS OF RIGHT KNEE: Primary | ICD-10-CM

## 2023-10-12 PROCEDURE — 73560 X-RAY EXAM OF KNEE 1 OR 2: CPT

## 2023-10-12 RX ORDER — MELOXICAM 7.5 MG/1
7.5 TABLET ORAL DAILY
Qty: 30 TABLET | Refills: 1 | Status: SHIPPED | OUTPATIENT
Start: 2023-10-12

## 2023-10-12 NOTE — PROGRESS NOTES
"Chief Complaint  Pain of the Right Knee    Subjective    History of Present Illness      Ishaan Booth is a 67 y.o. male who presents to [facility] for injury to the right knee.  History of Present Illness this is a patient who has had an ACL reconstructive surgery performed 15 years ago.  The patient has a sudden onset of pain and discomfort of the knee following an injury.  The patient was injured 3 weeks ago on 22 September 2023 when he stepped out of his truck.  There was a twisting injury to the medial aspect of the knee.  Since that time he has had pain and discomfort on the medial aspect of the knee.  The patient finds it very difficult to squat on the ground.  There is a sense of clicking popping medially on the knee.  The patient rates his pain as an 8 on a scale of 1-10.  Symptoms are worst when the patient is squatting on the ground.  His occupation is a  and by the end of the day he is limping quite significantly.  Pain Location:  RIGHT knee  Radiation: none  Quality: dull, aching  Intensity/Severity: moderate  Duration:  3 weeks  Progression of symptoms: yes, progressive worsening  Onset quality: sudden  Timing: intermittent  Aggravating Factors: rising after sitting, squatting  Alleviating Factors: NSAIDs  Previous Episodes: yes  Associated Symptoms: pain, swelling  ADLs Affected: work related activities, recreational activities/sports  Previous Treatment: NSAIDs       Objective   Vital Signs:   Ht 167.6 cm (66\")   Wt 99.1 kg (218 lb 6.4 oz)   BMI 35.25 kg/m²     Physical Exam  Physical Exam  Vitals signs and nursing note reviewed.   Constitutional:       Appearance: Normal appearance.   Pulmonary:      Effort: Pulmonary effort is normal.   Skin:     General: Skin is warm and dry.      Capillary Refill: Capillary refill takes less than 2 seconds.   Neurological:      General: No focal deficit present.      Mental Status: He is alert and " oriented to person, place, and time. Mental status is at baseline.   Psychiatric:         Mood and Affect: Mood normal.         Behavior: Behavior normal.         Thought Content: Thought content normal.         Judgment: Judgment normal.     Ortho Exam   Right knee (meniscus). The knee joint shows effusion with some thickening of the synovial membrane. There is tenderness over the meniscus. Apley's grinding test is positive over the joint line. Parag's sign is positive with increased pain on torsional testing. There is a distinct click in mid flexion. Range of motion is from 0-90 degrees of flexion. No instability on medial or lateral testing. Anterior and posterior drawer testing is negative. Lachman test is negative. Joint line tenderness is present to direct palpation. There is some tenderness over the medial face of the tibia just distal to the joint line. The dorsalis pedis and posterior tibial artery pulses are palpable. Common peroneal nerve function is well preserved. Gait is cautious and somewhat antalgic. Full extension causes the patient to have quite a bit of pain and discomfort.               Result Review :  The following data was reviewed by: Fernando Hines MD on 10/12/2023:      MRI reports are available in the office today.  Images of the right knee show a small joint effusion.  There is moderate patellofemoral degenerative change.  There is full-thickness loss along the medial weightbearing area of the medial femoral condyle.  There is no evidence of fracture.  There are surgical changes consistent with a prior ACL reconstruction with some thickening of the tissue anterior to the graft.  The graft itself appears to be intact.  There is a complex tear of the body and the posterior horn of the medial meniscus extending to the root.  There is severe patellofemoral and moderate medial compartmental joint degenerative changes noted.          Procedures           Assessment   Assessment and Plan     Diagnoses and all orders for this visit:    1. Old complex tear of medial meniscus of right knee (Primary)    Other orders  -     meloxicam (Mobic) 7.5 MG tablet; Take 1 tablet by mouth Daily.  Dispense: 30 tablet; Refill: 1          Follow Up   Compression/brace to prevent the knee from buckling and giving out.  Rest, ice, compression, and elevation (RICE) therapy  Stretching and strengthening exercises of the quads and the hamstrings.  Intra-articular steroid injection and viscosupplementation injections discussed with the patient.  Tablet meloxicam 7.5 mg tab 1 p.o. nightly for pain swelling and discomfort.  Recommended arthroscopy to the patient for the meniscal symptoms.  Eventually he will most likely need knee replacement surgery and we have discussed that with the patient at length.  He would like to defer that type of intervention as long as possible and I certainly agree with that.  Recent benefits of arthroscopic surgery of the knee discussed with the patient at length.  Risks of surgical procedure, possibility of infection, DVT, continued pain, continued progression of arthritis, use of allograft tissue, limited range of motion and strength, further surgical intervention, discussed with the patient.  Patient understands that arthroscopy will benefit mechanical symptoms of pain and instability but may not help with symptoms of arthritis.   OTC Tylenol 500-1000mg by mouth every 6 hours as needed for pain   Follow up in 3 month(s)  Patient was given instructions and counseling regarding his condition or for health maintenance advice. Please see specific information pulled into the AVS if appropriate.     Fernando Hines MD   Date of Encounter: 10/12/2023     EMR Dragon/Transcription disclaimer:  Much of this encounter note is an electronic transcription/translation of spoken language to printed text. The electronic translation of spoken language may permit erroneous, or at times, nonsensical words or  phrases to be inadvertently transcribed; Although I have reviewed the note for such errors, some may still exist.   Answers submitted by the patient for this visit:  Primary Reason for Visit (Submitted on 10/11/2023)  What is the primary reason for your visit?: Lower Extremity Injury  Lower Extremity Injury Questionnaire (Submitted on 10/11/2023)  Chief Complaint: Lower extremity pain  Incident occurred: more than 1 week ago  Incident location: at home  Injury mechanism: unknown  Pain location: right knee  Pain quality: aching  Pain - numeric: 5/10  Pain course: constant  loss of sensation: Yes  Foreign body present: no foreign bodies

## 2023-10-29 PROBLEM — M23.203 OLD COMPLEX TEAR OF MEDIAL MENISCUS OF RIGHT KNEE: Status: ACTIVE | Noted: 2023-10-29

## 2023-11-09 ENCOUNTER — OFFICE VISIT (OUTPATIENT)
Dept: ORTHOPEDIC SURGERY | Facility: CLINIC | Age: 68
End: 2023-11-09
Payer: OTHER MISCELLANEOUS

## 2023-11-09 VITALS — BODY MASS INDEX: 35.03 KG/M2 | HEIGHT: 66 IN | WEIGHT: 218 LBS

## 2023-11-09 DIAGNOSIS — Z98.890 S/P ROTATOR CUFF SURGERY: Primary | ICD-10-CM

## 2023-11-09 RX ORDER — ICOSAPENT ETHYL 1000 MG/1
CAPSULE ORAL
COMMUNITY
Start: 2023-10-21

## 2023-11-09 NOTE — PROGRESS NOTES
"Chief Complaint  Follow-up of the Right Shoulder    Subjective    History of Present Illness      Ishaan Booth is a 67 y.o. male who presents to [facility] for follow up on rotator cuff repair,  History of Present Illness This patient is following up on a right rotator cuff repair. He is now 3-1/2 months out from the surgical intervention. He has done extremely well postoperatively with surgical intervention and the physical therapy. The patient states that his rheumatoid disease is bothering him quite a bit. He states that he needs a shot of Humira to improve his musculoskeletal pain and discomfort. He states \"I am very  physical therapy to improve his range of motion. He is a  and is not quite ready to go back to work just yet because of the weakness of the shoulder. The pain and discomfort he had before has completely settled down and he seems to be doing a lot better postoperatively. He does need a short of Humira to help manage his symptoms as far as the rheumatoid arthritis is concerned.       Pain Location: RIGHT shoulder  Radiation: none  Quality: dull, aching  Intensity/Severity: mild-moderate  Duration: since 05/04/2023  Progression of symptoms: no worsening, symptoms stable/unchanged  Onset quality: sudden  Timing: intermittent  Aggravating Factors: repetitive motion  Alleviating Factors: NSAIDs, rest, stretching/PT/OT  Previous Episodes: yes  Associated Symptoms: pain, decreased ROM, decreased strength, stiffness and giving out  ADLs Affected: ambulating, work related activities, recreational activities/sports  Previous Treatment: prior surgery       Objective   Vital Signs:   Ht 167.6 cm (66\")   Wt 98.9 kg (218 lb)   BMI 35.19 kg/m²     Physical Exam  Physical Exam  Vitals signs and nursing note reviewed.   Constitutional:       Appearance: Normal appearance.   Pulmonary:      Effort: Pulmonary effort is normal.   Skin:     General: Skin is " warm and dry.      Capillary Refill: Capillary refill takes less than 2 seconds.   Neurological:      General: No focal deficit present.      Mental Status: He is alert and oriented to person, place, and time. Mental status is at baseline.   Psychiatric:         Mood and Affect: Mood normal.         Behavior: Behavior normal.         Thought Content: Thought content normal.         Judgment: Judgment normal.     Ortho Exam   Right shoulder. The patient is status-post rotator cuff repair 3.5 month(s) . Incision is clean and healing well. Arthroscopic portals are clean. Appropriate amounts of swelling and bruising are noted. The patients pain is well controlled. The passive range of motion is abduction 0-90 degrees, flexion 0-90 degrees. Axillary nerve function is well preserved. Radial artery pulses are palpable.      Result Review :  The following data was reviewed by: Fernando Hines MD on 11/09/2023:  Radiologic studies - see below for interpretation  no diagnostic testing performed this visit    Procedures     Assessment   Assessment and Plan    Diagnoses and all orders for this visit:    1. S/P rotator cuff surgery (Primary)          Follow Up   Aggressive ROM of the shoulder  Calcium and vit D for bone health.  Off work note given to patient.  Continue with PT for strengthening of the rotator cuff muscles.  Rest, ice, compression, and elevation (RICE) therapy  Stretching and strengthening exercises  OTC Alternate Ibuprofen and Tylenol as needed  Follow up in 6 week(s)  Patient was given instructions and counseling regarding his condition or for health maintenance advice. Please see specific information pulled into the AVS if appropriate.     Fernando Hines MD   Date of Encounter: 11/9/2023   Electronically signed by Fernando Hines MD, 11/09/23, 9:12 AM EST.     EMR Dragon/Transcription disclaimer:  Much of this encounter note is an electronic transcription/translation of spoken language to printed text. The  electronic translation of spoken language may permit erroneous, or at times, nonsensical words or phrases to be inadvertently transcribed; Although I have reviewed the note for such errors, some may still exist.

## 2023-12-14 ENCOUNTER — OFFICE VISIT (OUTPATIENT)
Dept: ORTHOPEDIC SURGERY | Facility: CLINIC | Age: 68
End: 2023-12-14
Payer: OTHER MISCELLANEOUS

## 2023-12-14 VITALS — TEMPERATURE: 97.8 F | HEIGHT: 66 IN | BODY MASS INDEX: 33.91 KG/M2 | WEIGHT: 211 LBS

## 2023-12-14 DIAGNOSIS — Z98.890 S/P ROTATOR CUFF SURGERY: Primary | ICD-10-CM

## 2023-12-14 NOTE — PROGRESS NOTES
"Chief Complaint  Follow-up of the Right Shoulder (Arthroscopy with mini open rotator cuff repair )    Subjective    History of Present Illness      Ishaan Booth is a 68 y.o. male who presents to [facility] for follow-up on right rotator cuff repair.  History of Present Illness this patient had sustained an injury to the right shoulder as a Workmen's Compensation injury.  He is a very  using equipment such as bobcat's and back hoes on a regular basis.  He also runs a bulldozer.  The patient has done well in terms of recovery from rotator cuff repair surgery.  His day-to-day activities have improved significantly further better.  He is able to use his arm and shoulder without too much difficulty.  Unfortunately he states that he does not have the strength and endurance to perform his customary activities including the use of a bulldozer just yet.  I do not think he is ready to go back to work in a safe fashion at this point.  Pain Location: RIGHT shoulder  Radiation: none  Quality: dull, aching  Intensity/Severity: moderate  Duration:  About 5 months  Progression of symptoms: no worsening, reports improvement  Onset quality: gradual   Timing: intermittent  Aggravating Factors: rotating motion, repetitive motion  Alleviating Factors: Tylenol  Previous Episodes: yes  Associated Symptoms: pain, swelling, clicking/popping  ADLs Affected: work related activities  Previous Treatment: NSAIDs and prior surgery       Objective   Vital Signs:   Temp 97.8 °F (36.6 °C)   Ht 167.6 cm (66\")   Wt 95.7 kg (211 lb)   BMI 34.06 kg/m²     Physical Exam  Physical Exam  Vitals signs and nursing note reviewed.   Constitutional:       Appearance: Normal appearance.   Pulmonary:      Effort: Pulmonary effort is normal.   Skin:     General: Skin is warm and dry.      Capillary Refill: Capillary refill takes less than 2 seconds.   Neurological:      General: No focal deficit present. "      Mental Status: He is alert and oriented to person, place, and time. Mental status is at baseline.   Psychiatric:         Mood and Affect: Mood normal.         Behavior: Behavior normal.         Thought Content: Thought content normal.         Judgment: Judgment normal.     Ortho Exam        Right shoulder. The patient is status-post rotator cuff repair 5 month(s) . Incision is clean and healing well. Arthroscopic portals are clean. Appropriate amounts of swelling and bruising are noted. The patients pain is well controlled. The passive range of motion is abduction 0-110 degrees, flexion 0-110 degrees. Axillary nerve function is well preserved. Radial artery pulses are palpable.        Result Review :  The following data was reviewed by: Fernando Hines MD on 12/14/2023:                Procedures           Assessment   Assessment and Plan    Diagnoses and all orders for this visit:    1. S/P rotator cuff surgery (Primary)          Follow Up   Calcium and vitamin D for bone health.  Continue with aggressive physical therapy to the shoulder to improve the strength and endurance of shoulder function.  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  Also the risk of reinjury is quite substantial and a recurrent tear of the rotator cuff would be a very difficult thing to manage in this patient.  If he does indeed suffer a recurrent rotator cuff tear then he would be a candidate for reverse total shoulder arthroplasty.  Rest, ice, compression, and elevation (RICE) therapy  Stretching and strengthening exercises of the flexors and abductors of the shoulder.  OTC Tylenol 500-1000mg by mouth every 6 hours as needed for pain   Follow up in 3 month(s)  Patient was given instructions and counseling regarding his condition or for health maintenance advice. Please see specific information pulled into the AVS if appropriate.     Fernando  MD Flora   Date of Encounter: 12/14/2023    EMR Dragon/Transcription disclaimer:  Much of this encounter note is an electronic transcription/translation of spoken language to printed text. The electronic translation of spoken language may permit erroneous, or at times, nonsensical words or phrases to be inadvertently transcribed; Although I have reviewed the note for such errors, some may still exist.

## 2023-12-14 NOTE — LETTER
December 14, 2023     Patient: Ishaan Booth   YOB: 1955   Date of Visit: 12/14/2023       To Whom It May Concern:    It is my medical opinion that Ishaan Booth was seen in my clinic today 12/14/2023, he should remain out of work at this time.  His work status will be evaluated at his next appointment in 3 months. We are currently in transition of switching offices. Once we have opened that office we will schedule the patient his 3 month follow up appointment at that time.     If you have any questions please feel free to contact my office.        Sincerely,        Fernando Hines MD    CC:   No Recipients

## (undated) DEVICE — SUT VIC 2/0 CT1 36IN

## (undated) DEVICE — APPL CHLORAPREP HI/LITE 26ML ORNG

## (undated) DEVICE — PK ARTHSCP SHLDR TOWER 40

## (undated) DEVICE — OPTIFOAM GENTLE SA, POSTOP, 4X8: Brand: MEDLINE

## (undated) DEVICE — ADHS SKIN SURG TISS VISC PREMIERPRO EXOFIN HI/VISC FAST/DRY

## (undated) DEVICE — SKIN PREP TRAY W/CHG: Brand: MEDLINE INDUSTRIES, INC.

## (undated) DEVICE — GLV SURG PREMIERPRO ORTHO LTX PF SZ8 BRN

## (undated) DEVICE — ABL ASP APOLLO RF XL 90D

## (undated) DEVICE — TBG PENCL TELESCP MEGADYNE SMOKE EVAC 10FT

## (undated) DEVICE — TBG ARTHSCP PUMP W CONN/REDUC 8FT

## (undated) DEVICE — SUT VIC 0 CT1 36IN J946H

## (undated) DEVICE — SUT VIC 3/0 SH 27IN J416H

## (undated) DEVICE — MEDI-VAC YANKAUER SUCTION HANDLE W/BULBOUS TIP: Brand: CARDINAL HEALTH

## (undated) DEVICE — DRAPE,SHOULDER,BEACH ULTRAGARD: Brand: MEDLINE

## (undated) DEVICE — IRRIGATOR BULB ASEPTO 60CC STRL

## (undated) DEVICE — INTENDED FOR TISSUE SEPARATION, AND OTHER PROCEDURES THAT REQUIRE A SHARP SURGICAL BLADE TO PUNCTURE OR CUT.: Brand: BARD-PARKER ® CARBON RIB-BACK BLADES

## (undated) DEVICE — GLV SURG SENSICARE PI LF PF 8 GRN STRL

## (undated) DEVICE — INTENT TO BE USED WITH SUTURE MATERIAL FOR TISSUE CLOSURE: Brand: RICHARD-ALLAN® NEEDLE 1/2 CIRCLE TAPER

## (undated) DEVICE — BUR SHAVER FLUSHCUT 8FLUT 5MM 13CM

## (undated) DEVICE — INTEGUSEAL MICROBIAL SEALANT: Brand: AVANOS

## (undated) DEVICE — CANN TRPL DAM 7X7MM NO VLV

## (undated) DEVICE — SUT MNCRYL 4/0 PS2 18 IN

## (undated) DEVICE — BLD SHAVER BONECUTTER 5MM 13CM

## (undated) DEVICE — ANTIBACTERIAL UNDYED BRAIDED (POLYGLACTIN 910), SYNTHETIC ABSORBABLE SUTURE: Brand: COATED VICRYL

## (undated) DEVICE — ZIPPERED TOGA, 2X LARGE: Brand: FLYTE, SURGICOOL

## (undated) DEVICE — TP NDL SCORPION MULTIFIRE

## (undated) DEVICE — ARM SLING: Brand: DEROYAL

## (undated) DEVICE — KT POSTN ARM TRIMANO BEACH CHR W/DRP

## (undated) DEVICE — SYR LUERLOK 30CC

## (undated) DEVICE — CANNULA SMOOTH FLEX 6.5 X 72MM: Brand: CLEAR-TRAC

## (undated) DEVICE — TBG ARTHSCP PT W CONN/REDUC 8FT

## (undated) DEVICE — GLV SURG SENSICARE PI PF LF 7 GRN STRL

## (undated) DEVICE — DRAPE,U/ SHT,SPLIT,PLAS,STERIL: Brand: MEDLINE